# Patient Record
Sex: FEMALE
[De-identification: names, ages, dates, MRNs, and addresses within clinical notes are randomized per-mention and may not be internally consistent; named-entity substitution may affect disease eponyms.]

---

## 2020-09-22 PROBLEM — Z00.00 ENCOUNTER FOR PREVENTIVE HEALTH EXAMINATION: Status: ACTIVE | Noted: 2020-09-22

## 2020-09-24 ENCOUNTER — APPOINTMENT (OUTPATIENT)
Dept: UROLOGY | Facility: CLINIC | Age: 70
End: 2020-09-24
Payer: MEDICARE

## 2020-09-24 ENCOUNTER — TRANSCRIPTION ENCOUNTER (OUTPATIENT)
Age: 70
End: 2020-09-24

## 2020-09-24 VITALS
DIASTOLIC BLOOD PRESSURE: 72 MMHG | SYSTOLIC BLOOD PRESSURE: 140 MMHG | TEMPERATURE: 97.6 F | HEART RATE: 62 BPM | WEIGHT: 159 LBS | BODY MASS INDEX: 32.05 KG/M2 | HEIGHT: 59 IN

## 2020-09-24 DIAGNOSIS — Z63.4 DISAPPEARANCE AND DEATH OF FAMILY MEMBER: ICD-10-CM

## 2020-09-24 DIAGNOSIS — E11.9 TYPE 2 DIABETES MELLITUS W/OUT COMPLICATIONS: ICD-10-CM

## 2020-09-24 DIAGNOSIS — Z79.4 TYPE 2 DIABETES MELLITUS W/OUT COMPLICATIONS: ICD-10-CM

## 2020-09-24 PROCEDURE — 99204 OFFICE O/P NEW MOD 45 MIN: CPT

## 2020-09-24 SDOH — SOCIAL STABILITY - SOCIAL INSECURITY: DISSAPEARANCE AND DEATH OF FAMILY MEMBER: Z63.4

## 2020-09-24 NOTE — REVIEW OF SYSTEMS
[see HPI] : see HPI [Seen by urologist before (Name)  ___] : Previously seen by a urologist: [unfilled] [History of kidney stones] : history of kidney stones [Negative] : Heme/Lymph

## 2020-09-29 PROBLEM — E11.9 DIABETES MELLITUS TYPE 2, INSULIN DEPENDENT: Status: RESOLVED | Noted: 2020-09-24 | Resolved: 2020-09-29

## 2020-09-29 PROBLEM — Z63.4 WIDOWED: Status: ACTIVE | Noted: 2020-09-24

## 2020-09-29 LAB — BACTERIA UR CULT: ABNORMAL

## 2020-09-29 NOTE — HISTORY OF PRESENT ILLNESS
[FreeTextEntry1] : cc back pain \par 71 yo fem referred for eval kidney stone \par had eswl 2019 for 4 mm stone \par in august into sept had multiple admission nyp and had multiple stents placed\par last stent removed by pt 10 days ago via string\par

## 2020-09-30 ENCOUNTER — APPOINTMENT (OUTPATIENT)
Dept: UROLOGY | Facility: CLINIC | Age: 70
End: 2020-09-30
Payer: MEDICARE

## 2020-09-30 VITALS — TEMPERATURE: 97.6 F

## 2020-09-30 PROCEDURE — 99214 OFFICE O/P EST MOD 30 MIN: CPT

## 2020-09-30 NOTE — ASSESSMENT
[FreeTextEntry1] : ct reviewed with pt \par f/u chest ct ordered by pmd\par \par will complete abx\par f/u 1 week sono \par if hydro not resolved will consider cysto retrograde

## 2020-09-30 NOTE — HISTORY OF PRESENT ILLNESS
[FreeTextEntry1] : cc back pain \par 69 yo fem referred for eval kidney stone \par had eswl 2019 for 4 mm stone \par in august into sept had multiple admission nyp and had multiple stents placed\par last stent removed by pt 10 days ago via string\par \par ct mild right hydroureteronephrosis no stone \par lung lesions\par taking abx feels a little better

## 2020-10-08 ENCOUNTER — APPOINTMENT (OUTPATIENT)
Dept: UROLOGY | Facility: CLINIC | Age: 70
End: 2020-10-08
Payer: MEDICARE

## 2020-10-08 PROCEDURE — 99213 OFFICE O/P EST LOW 20 MIN: CPT | Mod: 25

## 2020-10-08 PROCEDURE — 76775 US EXAM ABDO BACK WALL LIM: CPT

## 2020-10-16 NOTE — HISTORY OF PRESENT ILLNESS
[FreeTextEntry1] : cc back pain \par 69 yo fem referred for eval kidney stone \par had eswl 2019 for 4 mm stone \par in august into sept had multiple admission nyp and had multiple stents placed\par last stent removed by pt 10 days ago via string\par \par ct mild right hydroureteronephrosis no stone \par lung lesions\par taking abx feels a little better\par \par

## 2020-10-16 NOTE — ASSESSMENT
[FreeTextEntry1] : sono today shows persitant hydro/fullness on right \par she is feeling better\par reviewed options \par cont observation vs cysto retrograde possible steb\par does not want any more procedures/stents at this time \par will observe \par f/u sono 1 month

## 2020-10-29 ENCOUNTER — APPOINTMENT (OUTPATIENT)
Dept: UROLOGY | Facility: CLINIC | Age: 70
End: 2020-10-29

## 2020-10-29 ENCOUNTER — APPOINTMENT (OUTPATIENT)
Dept: UROLOGY | Facility: CLINIC | Age: 70
End: 2020-10-29
Payer: MEDICARE

## 2020-10-29 VITALS
DIASTOLIC BLOOD PRESSURE: 97 MMHG | BODY MASS INDEX: 32.05 KG/M2 | HEART RATE: 66 BPM | HEIGHT: 59 IN | OXYGEN SATURATION: 94 % | WEIGHT: 159 LBS | TEMPERATURE: 97.8 F | RESPIRATION RATE: 13 BRPM | SYSTOLIC BLOOD PRESSURE: 144 MMHG

## 2020-10-29 PROCEDURE — 99214 OFFICE O/P EST MOD 30 MIN: CPT

## 2020-10-29 RX ORDER — CIPROFLOXACIN HYDROCHLORIDE 500 MG/1
500 TABLET, FILM COATED ORAL TWICE DAILY
Qty: 6 | Refills: 0 | Status: DISCONTINUED | COMMUNITY
Start: 2020-09-29 | End: 2020-10-29

## 2020-10-29 NOTE — ASSESSMENT
[FreeTextEntry1] : Recent events, urine and radiological studies were reviewed with her. She has mild right hydronephrosis which could be residual effect from having multiple procedures. Possibility of stricture formation or inflammatory changes were discussed. Regardless, recommend waiting 4-6 weeks and repeat renal ultrasound. Urine culture will be sent. Try to stay hydrated. Recommended that she can return to Dr. Rodrigues for followup to her kidney stones and renal ultrasound (she lives in [place]). Alternatively repeat cystoscopy and retrograde pyelogram and possible ureteroscopy could be considered. She would rather avoid any further surgery at this time.

## 2020-10-29 NOTE — PHYSICAL EXAM
[General Appearance - Well Developed] : well developed [General Appearance - Well Nourished] : well nourished [Normal Appearance] : normal appearance [Well Groomed] : well groomed [General Appearance - In No Acute Distress] : no acute distress [Abdomen Soft] : soft [Abdomen Tenderness] : non-tender [Costovertebral Angle Tenderness] : no ~M costovertebral angle tenderness [FreeTextEntry1] : Bladder not distended.  [] : no respiratory distress [Respiration, Rhythm And Depth] : normal respiratory rhythm and effort [Exaggerated Use Of Accessory Muscles For Inspiration] : no accessory muscle use [Oriented To Time, Place, And Person] : oriented to person, place, and time [Affect] : the affect was normal [Mood] : the mood was normal [Not Anxious] : not anxious

## 2020-10-29 NOTE — HISTORY OF PRESENT ILLNESS
[FreeTextEntry1] : She is a 70-year-old woman who is seen today to discuss her kidney stone and urinary tract infections. According to the patient, between June and September 2020 she was hospitalized 4 times at [facility] for a 9 mm right ureteral stone and underwent ureteroscopy and stent placement and removal several times. She has mild flank discomfort now. She does not have any stents at this time. CT scan in September 2019 showed mild right hydronephrosis, pelvic calcifications most likely phleboliths, lung nodules, no lymphadenopathy and a large midline hernia. She followed at [facility] regarding the lung nodules. Previously she had Escherichia coli in her urine for about 10 years and treatment with IV antibiotics via PICC line was able to eradicate it, according to the patient. In September 2020 urine culture show Citrobacter. She was given Cipro. Residual urine today was less than 100 cc. Urine has a bad odor.

## 2020-10-30 LAB
APPEARANCE: ABNORMAL
BACTERIA: ABNORMAL
BILIRUBIN URINE: NEGATIVE
BLOOD URINE: NEGATIVE
CALCIUM OXALATE CRYSTALS: ABNORMAL
COLOR: NORMAL
GLUCOSE QUALITATIVE U: NEGATIVE
HYALINE CASTS: 0 /LPF
KETONES URINE: NEGATIVE
LEUKOCYTE ESTERASE URINE: ABNORMAL
MICROSCOPIC-UA: NORMAL
NITRITE URINE: POSITIVE
PH URINE: 6
PROTEIN URINE: NORMAL
RED BLOOD CELLS URINE: 4 /HPF
SPECIFIC GRAVITY URINE: 1.02
SQUAMOUS EPITHELIAL CELLS: 3 /HPF
UROBILINOGEN URINE: NORMAL
WHITE BLOOD CELLS URINE: 8 /HPF

## 2020-11-03 LAB — BACTERIA UR CULT: ABNORMAL

## 2020-11-03 RX ORDER — CEPHALEXIN 500 MG/1
500 CAPSULE ORAL DAILY
Qty: 21 | Refills: 0 | Status: ACTIVE | COMMUNITY
Start: 2020-11-03 | End: 1900-01-01

## 2020-11-17 NOTE — PHYSICAL EXAM
[General Appearance - Well Developed] : well developed [General Appearance - Well Nourished] : well nourished [Normal Appearance] : normal appearance [Well Groomed] : well groomed no pt states they are incomplete [General Appearance - In No Acute Distress] : no acute distress [Abdomen Soft] : soft [Abdomen Tenderness] : non-tender [Costovertebral Angle Tenderness] : no ~M costovertebral angle tenderness [Urinary Bladder Findings] : the bladder was normal on palpation [Edema] : no peripheral edema [] : no respiratory distress [Respiration, Rhythm And Depth] : normal respiratory rhythm and effort [Exaggerated Use Of Accessory Muscles For Inspiration] : no accessory muscle use [Oriented To Time, Place, And Person] : oriented to person, place, and time [Affect] : the affect was normal [Mood] : the mood was normal [Not Anxious] : not anxious [Normal Station and Gait] : the gait and station were normal for the patient's age [No Focal Deficits] : no focal deficits [No Palpable Adenopathy] : no palpable adenopathy

## 2020-11-18 ENCOUNTER — APPOINTMENT (OUTPATIENT)
Dept: UROLOGY | Facility: CLINIC | Age: 70
End: 2020-11-18
Payer: MEDICARE

## 2020-11-18 DIAGNOSIS — N20.0 CALCULUS OF KIDNEY: ICD-10-CM

## 2020-11-18 DIAGNOSIS — N39.0 URINARY TRACT INFECTION, SITE NOT SPECIFIED: ICD-10-CM

## 2020-11-18 DIAGNOSIS — N13.30 UNSPECIFIED HYDRONEPHROSIS: ICD-10-CM

## 2020-11-18 PROCEDURE — 76775 US EXAM ABDO BACK WALL LIM: CPT

## 2020-11-18 PROCEDURE — 99214 OFFICE O/P EST MOD 30 MIN: CPT | Mod: 25

## 2020-11-23 LAB — BACTERIA UR CULT: ABNORMAL

## 2020-11-23 RX ORDER — CEFUROXIME AXETIL 500 MG/1
500 TABLET ORAL
Qty: 14 | Refills: 0 | Status: ACTIVE | COMMUNITY
Start: 2020-11-18 | End: 1900-01-01

## 2020-11-23 NOTE — ASSESSMENT
[FreeTextEntry1] : sono shows persistent mild hydro but good ureteral jet \par will do cysto retrograde possibe urs \par risk of bleeding infection damage to ureter reviewed\par \par check ucx

## 2020-11-23 NOTE — HISTORY OF PRESENT ILLNESS
[FreeTextEntry1] : cc back pain \par 71 yo fem referred for eval kidney stone \par had eswl 2019 for 4 mm stone \par in august into sept had multiple admission nyp and had multiple stents placed\par last stent removedsept\par \par ct mild right hydroureteronephrosis no stone \par lung lesions\par t feels a little better\par \par

## 2020-12-04 ENCOUNTER — OUTPATIENT (OUTPATIENT)
Dept: OUTPATIENT SERVICES | Facility: HOSPITAL | Age: 70
LOS: 1 days | End: 2020-12-04
Payer: MEDICARE

## 2020-12-04 VITALS
SYSTOLIC BLOOD PRESSURE: 147 MMHG | OXYGEN SATURATION: 97 % | RESPIRATION RATE: 16 BRPM | WEIGHT: 162.92 LBS | HEIGHT: 59 IN | TEMPERATURE: 99 F | DIASTOLIC BLOOD PRESSURE: 80 MMHG

## 2020-12-04 DIAGNOSIS — Z90.49 ACQUIRED ABSENCE OF OTHER SPECIFIED PARTS OF DIGESTIVE TRACT: Chronic | ICD-10-CM

## 2020-12-04 DIAGNOSIS — Z98.890 OTHER SPECIFIED POSTPROCEDURAL STATES: Chronic | ICD-10-CM

## 2020-12-04 DIAGNOSIS — Z98.49 CATARACT EXTRACTION STATUS, UNSPECIFIED EYE: Chronic | ICD-10-CM

## 2020-12-04 DIAGNOSIS — N39.0 URINARY TRACT INFECTION, SITE NOT SPECIFIED: ICD-10-CM

## 2020-12-04 DIAGNOSIS — I10 ESSENTIAL (PRIMARY) HYPERTENSION: ICD-10-CM

## 2020-12-04 DIAGNOSIS — Z98.84 BARIATRIC SURGERY STATUS: Chronic | ICD-10-CM

## 2020-12-04 DIAGNOSIS — Z98.51 TUBAL LIGATION STATUS: Chronic | ICD-10-CM

## 2020-12-04 DIAGNOSIS — Z01.818 ENCOUNTER FOR OTHER PREPROCEDURAL EXAMINATION: ICD-10-CM

## 2020-12-04 DIAGNOSIS — E78.5 HYPERLIPIDEMIA, UNSPECIFIED: ICD-10-CM

## 2020-12-04 DIAGNOSIS — E11.9 TYPE 2 DIABETES MELLITUS WITHOUT COMPLICATIONS: ICD-10-CM

## 2020-12-04 LAB
APPEARANCE UR: CLEAR — SIGNIFICANT CHANGE UP
BILIRUB UR-MCNC: NEGATIVE — SIGNIFICANT CHANGE UP
COLOR SPEC: YELLOW — SIGNIFICANT CHANGE UP
DIFF PNL FLD: NEGATIVE — SIGNIFICANT CHANGE UP
GLUCOSE UR QL: NEGATIVE — SIGNIFICANT CHANGE UP
KETONES UR-MCNC: NEGATIVE — SIGNIFICANT CHANGE UP
LEUKOCYTE ESTERASE UR-ACNC: NEGATIVE — SIGNIFICANT CHANGE UP
NITRITE UR-MCNC: NEGATIVE — SIGNIFICANT CHANGE UP
PH UR: 5 — SIGNIFICANT CHANGE UP (ref 5–8)
PROT UR-MCNC: NEGATIVE — SIGNIFICANT CHANGE UP
SP GR SPEC: 1.02 — SIGNIFICANT CHANGE UP (ref 1.01–1.02)
UROBILINOGEN FLD QL: NEGATIVE — SIGNIFICANT CHANGE UP

## 2020-12-04 PROCEDURE — 87186 SC STD MICRODIL/AGAR DIL: CPT

## 2020-12-04 PROCEDURE — G0463: CPT

## 2020-12-04 PROCEDURE — 81003 URINALYSIS AUTO W/O SCOPE: CPT

## 2020-12-04 PROCEDURE — 87086 URINE CULTURE/COLONY COUNT: CPT

## 2020-12-04 NOTE — H&P PST ADULT - HISTORY OF PRESENT ILLNESS
70 years old female presented to [facility] for evaluation before surgery, patient was diagnosed with urinary tract infection , unspecified and is scheduled for cytoscopy, right ureteroscopy, with stent insertion on 12/14/2020.

## 2020-12-04 NOTE — H&P PST ADULT - RS GEN PE MLT RESP DETAILS PC
airway patent/breath sounds equal/good air movement/clear to auscultation bilaterally/no rales/no rhonchi/no wheezes

## 2020-12-04 NOTE — H&P PST ADULT - NEGATIVE GASTROINTESTINAL SYMPTOMS
no nausea/no vomiting/no diarrhea/no constipation/no change in bowel habits/no flatulence/no abdominal pain

## 2020-12-04 NOTE — H&P PST ADULT - NEGATIVE NEUROLOGICAL SYMPTOMS
no transient paralysis/no weakness/no paresthesias/no generalized seizures/no focal seizures/no syncope/no tremors/no vertigo/no loss of sensation/no difficulty walking/no headache/no loss of consciousness/no hemiparesis/no confusion

## 2020-12-04 NOTE — H&P PST ADULT - NSICDXPASTSURGICALHX_GEN_ALL_CORE_FT
PAST SURGICAL HISTORY:  H/O gastric bypass 2010    S/P cataract surgery bilateral eyes with artifical lenses - 2010    S/P cholecystectomy 1997    S/P extracorporeal shock wave therapy left kidney  stones lithotripsy- 2019, left kidney stones laser lithtripsy 7/2020    S/P partial thyroidectomy left - 1978- pt is not taking Levothyroxine ( no need) , under care of Emdocrinology    S/P tubal ligation 1985

## 2020-12-04 NOTE — H&P PST ADULT - NSICDXPROBLEM_GEN_ALL_CORE_FT
PROBLEM DIAGNOSES  Problem: Urinary tract infection, site not specified  Assessment and Plan: Patient is scheduled for cytoscopy, right ureteroscopy, with stent insertion on 12/14/2020.     Problem: Hypertension  Assessment and Plan: Continue antihypertensive meds and take with sips of water on day of surgery.  Follow-up with PCP postop for management.      Problem: HLD (hyperlipidemia)  Assessment and Plan: Instructed pt to continue Atorvastatin. Follow-up with PCP postop for lipid management    Problem: Type 2 diabetes mellitus  Assessment and Plan: Take 11 unitsof Basaglar night before surgery as usual. NPO since midnight. Perioperative glucose monitoring and cover as needed. Follow-up with PCP for diabetic management

## 2020-12-04 NOTE — H&P PST ADULT - NEGATIVE GENERAL GENITOURINARY SYMPTOMS
no hematuria/no renal colic/no flank pain L/no urine discoloration/no gas in urine/no bladder infections/no incontinence/no dysuria/no urinary hesitancy/normal urinary frequency

## 2020-12-04 NOTE — H&P PST ADULT - HEMATOLOGY/LYMPHATICS
Reducible, not painful to palpation; CT without evidence of incarceration/strangulation  - Continue to monitor details…

## 2020-12-04 NOTE — H&P PST ADULT - NSICDXPASTMEDICALHX_GEN_ALL_CORE_FT
PAST MEDICAL HISTORY:  Anxiety     Bilateral dry eyes     Cataract both eyes- hx of    H/O cholecystitis     History of panic attacks     HLD (hyperlipidemia)     HTN (hypertension)     Hydronephrosis, right     Kidney stone on left side hx of    Recurrent UTI (urinary tract infection) with multiple ureteral stents insertion    Thyroid mass hx of    Type 2 diabetes mellitus     Vitamin D deficiency

## 2020-12-06 DIAGNOSIS — Z01.818 ENCOUNTER FOR OTHER PREPROCEDURAL EXAMINATION: ICD-10-CM

## 2020-12-11 ENCOUNTER — APPOINTMENT (OUTPATIENT)
Dept: DISASTER EMERGENCY | Facility: CLINIC | Age: 70
End: 2020-12-11

## 2020-12-13 LAB — SARS-COV-2 N GENE NPH QL NAA+PROBE: NOT DETECTED

## 2020-12-14 ENCOUNTER — APPOINTMENT (OUTPATIENT)
Dept: UROLOGY | Facility: HOSPITAL | Age: 70
End: 2020-12-14

## 2021-11-21 ENCOUNTER — TRANSCRIPTION ENCOUNTER (OUTPATIENT)
Age: 71
End: 2021-11-21

## 2021-11-21 ENCOUNTER — RESULT REVIEW (OUTPATIENT)
Age: 71
End: 2021-11-21

## 2021-11-22 ENCOUNTER — OUTPATIENT (OUTPATIENT)
Dept: OUTPATIENT SERVICES | Facility: HOSPITAL | Age: 71
LOS: 1 days | Discharge: HOME | End: 2021-11-22
Payer: MEDICARE

## 2021-11-22 DIAGNOSIS — Z98.49 CATARACT EXTRACTION STATUS, UNSPECIFIED EYE: Chronic | ICD-10-CM

## 2021-11-22 DIAGNOSIS — Z98.890 OTHER SPECIFIED POSTPROCEDURAL STATES: Chronic | ICD-10-CM

## 2021-11-22 DIAGNOSIS — Z98.51 TUBAL LIGATION STATUS: Chronic | ICD-10-CM

## 2021-11-22 DIAGNOSIS — Z90.49 ACQUIRED ABSENCE OF OTHER SPECIFIED PARTS OF DIGESTIVE TRACT: Chronic | ICD-10-CM

## 2021-11-22 DIAGNOSIS — Z98.84 BARIATRIC SURGERY STATUS: Chronic | ICD-10-CM

## 2021-11-22 LAB
GLUCOSE BLDC GLUCOMTR-MCNC: 184 MG/DL — HIGH (ref 70–99)
GLUCOSE BLDC GLUCOMTR-MCNC: 88 MG/DL — SIGNIFICANT CHANGE UP (ref 70–99)
SARS-COV-2 RNA SPEC QL NAA+PROBE: NEGATIVE — SIGNIFICANT CHANGE UP

## 2021-11-22 PROCEDURE — 88304 TISSUE EXAM BY PATHOLOGIST: CPT | Mod: 26

## 2021-11-22 RX ORDER — DOCUSATE SODIUM 100 MG
1 CAPSULE ORAL
Qty: 30 | Refills: 0
Start: 2021-11-22

## 2021-11-22 RX ORDER — OXYCODONE HYDROCHLORIDE 5 MG/1
1 TABLET ORAL
Qty: 10 | Refills: 0
Start: 2021-11-22

## 2021-11-22 NOTE — BRIEF OPERATIVE NOTE - OPERATION/FINDINGS
Patient prone. Sterile prepped and draped. Incision with 15 blade over neck and upper back mass. Dissection around fascia over the mass. Open the mass capsule, blunt dissection and excision of mass. Closure of fascial defect, approximation of fascia to subcutaneous tissue with Vicryl 3.0 and 2.0. Skin closure with Monocryl. Dermabond. Compression dressing.

## 2021-11-30 LAB — SURGICAL PATHOLOGY STUDY: SIGNIFICANT CHANGE UP

## 2022-01-07 ENCOUNTER — TRANSCRIPTION ENCOUNTER (OUTPATIENT)
Age: 72
End: 2022-01-07

## 2024-05-06 RX ORDER — INSULIN GLARGINE 100 [IU]/ML
11 INJECTION, SOLUTION SUBCUTANEOUS
Qty: 0 | Refills: 0 | DISCHARGE

## 2024-05-06 RX ORDER — RAMIPRIL 5 MG
1 CAPSULE ORAL
Qty: 0 | Refills: 0 | DISCHARGE

## 2024-05-06 RX ORDER — ERGOCALCIFEROL 1.25 MG/1
1 CAPSULE ORAL
Qty: 0 | Refills: 0 | DISCHARGE

## 2024-05-06 RX ORDER — DIAZEPAM 5 MG
1 TABLET ORAL
Qty: 0 | Refills: 0 | DISCHARGE

## 2024-05-06 RX ORDER — ATORVASTATIN CALCIUM 80 MG/1
1 TABLET, FILM COATED ORAL
Qty: 0 | Refills: 0 | DISCHARGE

## 2024-09-09 NOTE — H&P PST ADULT - LAST CARDIAC ANGIOGRAM/IMAGING
HISTORY OF PRESENT ILLNESS: Deepika Bhandari is a 31 year old female who comes in today complaining of Skin Problem (Patient is here for evaluation of an ongoing skin condition. Patient states she has seen her PCP multiple times for this. What she has been given is not working. She is requesting Ivermectin because she thinks she has worms. She was told she needed to be evaluated for this. Has a dermatology appointment scheduled soon.)  .  Patient presents with some ongoing skin issues over the last several months has been seen multiple times.  Concerned about possible worms.  She has a schedule appoint with dermatology later this week.  Discomfort in the areas.  I have reviewed the patient's medications and allergies, past medical, surgical, social and family history, updating these as appropriate.  See Histories section of the EMR for a display of this information...       REVIEW OF SYSTEMS:   Otherwise negative no fever    PHYSICAL EXAMINATION:  Vitals:    09/09/24 1756   Pulse: (!) 113   Resp: 20   Temp: 98.5 °F (36.9 °C)     Alert female and there is multiple variety of skin lesions noted especially on her hands fingers forearms.        ASSESSMENT/PLAN:  1. Rash/skin eruption  To follow-up with dermatology as scheduled I did advise there was nothing more urgent I could today I did recommend she try Tylenol or ibuprofen for pain         never

## 2025-04-08 NOTE — H&P PST ADULT - CARDIOVASCULAR
What Type Of Note Output Would You Prefer (Optional)?: Standard Output How Severe Is Your Rash?: moderate Is This A New Presentation, Or A Follow-Up?: Rash detailed exam details…